# Patient Record
Sex: FEMALE | Race: BLACK OR AFRICAN AMERICAN | NOT HISPANIC OR LATINO
[De-identification: names, ages, dates, MRNs, and addresses within clinical notes are randomized per-mention and may not be internally consistent; named-entity substitution may affect disease eponyms.]

---

## 2017-03-26 ENCOUNTER — RESULT REVIEW (OUTPATIENT)
Age: 57
End: 2017-03-26

## 2019-10-22 ENCOUNTER — OUTPATIENT (OUTPATIENT)
Dept: OUTPATIENT SERVICES | Facility: HOSPITAL | Age: 59
LOS: 1 days | Discharge: HOME | End: 2019-10-22

## 2019-10-22 DIAGNOSIS — D72.89 OTHER SPECIFIED DISORDERS OF WHITE BLOOD CELLS: ICD-10-CM

## 2019-11-07 ENCOUNTER — RESULT REVIEW (OUTPATIENT)
Age: 59
End: 2019-11-07

## 2019-11-07 ENCOUNTER — APPOINTMENT (OUTPATIENT)
Dept: HEMATOLOGY ONCOLOGY | Facility: CLINIC | Age: 59
End: 2019-11-07
Payer: MEDICARE

## 2019-11-07 ENCOUNTER — OUTPATIENT (OUTPATIENT)
Dept: OUTPATIENT SERVICES | Facility: HOSPITAL | Age: 59
LOS: 1 days | End: 2019-11-07
Payer: MEDICARE

## 2019-11-07 VITALS
BODY MASS INDEX: 28.34 KG/M2 | OXYGEN SATURATION: 98 % | RESPIRATION RATE: 18 BRPM | TEMPERATURE: 98.3 F | HEIGHT: 66 IN | SYSTOLIC BLOOD PRESSURE: 138 MMHG | WEIGHT: 176.37 LBS | HEART RATE: 60 BPM | DIASTOLIC BLOOD PRESSURE: 84 MMHG

## 2019-11-07 DIAGNOSIS — Z87.891 PERSONAL HISTORY OF NICOTINE DEPENDENCE: ICD-10-CM

## 2019-11-07 DIAGNOSIS — D72.829 ELEVATED WHITE BLOOD CELL COUNT, UNSPECIFIED: ICD-10-CM

## 2019-11-07 DIAGNOSIS — Z78.9 OTHER SPECIFIED HEALTH STATUS: ICD-10-CM

## 2019-11-07 DIAGNOSIS — B37.9 CANDIDIASIS, UNSPECIFIED: ICD-10-CM

## 2019-11-07 LAB
EOSINOPHIL # BLD AUTO: 0.1 K/UL — SIGNIFICANT CHANGE UP (ref 0–0.5)
ERYTHROCYTE [SEDIMENTATION RATE] IN BLOOD: 22 MM/HR — HIGH (ref 0–20)
HCT VFR BLD CALC: 37.1 % — SIGNIFICANT CHANGE UP (ref 34.5–45)
HGB BLD-MCNC: 12.6 G/DL — SIGNIFICANT CHANGE UP (ref 11.5–15.5)
LG PLATELETS BLD QL AUTO: SLIGHT — SIGNIFICANT CHANGE UP
LYMPHOCYTES # BLD AUTO: 23 % — SIGNIFICANT CHANGE UP (ref 13–44)
LYMPHOCYTES # BLD AUTO: 3.2 K/UL — SIGNIFICANT CHANGE UP (ref 1–3.3)
MCHC RBC-ENTMCNC: 26.6 PG — LOW (ref 27–34)
MCHC RBC-ENTMCNC: 34 G/DL — SIGNIFICANT CHANGE UP (ref 32–36)
MCV RBC AUTO: 78.2 FL — LOW (ref 80–100)
MONOCYTES # BLD AUTO: 0.7 K/UL — SIGNIFICANT CHANGE UP (ref 0–0.9)
MONOCYTES NFR BLD AUTO: 5 % — SIGNIFICANT CHANGE UP (ref 2–14)
NEUTROPHILS # BLD AUTO: 8 K/UL — HIGH (ref 1.8–7.4)
NEUTROPHILS NFR BLD AUTO: 72 % — SIGNIFICANT CHANGE UP (ref 43–77)
PLAT MORPH BLD: NORMAL — SIGNIFICANT CHANGE UP
PLATELET # BLD AUTO: 414 K/UL — HIGH (ref 150–400)
RBC # BLD: 4.75 M/UL — SIGNIFICANT CHANGE UP (ref 3.8–5.2)
RBC # FLD: 13.3 % — SIGNIFICANT CHANGE UP (ref 10.3–14.5)
RBC BLD AUTO: SIGNIFICANT CHANGE UP
WBC # BLD: 12 K/UL — HIGH (ref 3.8–10.5)
WBC # FLD AUTO: 12 K/UL — HIGH (ref 3.8–10.5)

## 2019-11-07 PROCEDURE — 88185 FLOWCYTOMETRY/TC ADD-ON: CPT

## 2019-11-07 PROCEDURE — 87205 SMEAR GRAM STAIN: CPT

## 2019-11-07 PROCEDURE — 88184 FLOWCYTOMETRY/ TC 1 MARKER: CPT

## 2019-11-07 PROCEDURE — 99204 OFFICE O/P NEW MOD 45 MIN: CPT

## 2019-11-07 PROCEDURE — 88189 FLOWCYTOMETRY/READ 16 & >: CPT

## 2019-11-07 RX ORDER — FLUCONAZOLE 100 MG/1
100 TABLET ORAL DAILY
Qty: 7 | Refills: 0 | Status: ACTIVE | COMMUNITY
Start: 2019-11-07 | End: 1900-01-01

## 2019-11-08 LAB — TM INTERPRETATION: SIGNIFICANT CHANGE UP

## 2019-11-10 NOTE — REVIEW OF SYSTEMS
[Fatigue] : fatigue [Negative] : Allergic/Immunologic [FreeTextEntry2] : fatigue and weakness for years worse this year.   [FreeTextEntry8] : yeast infection that will not stop.

## 2019-11-10 NOTE — ASSESSMENT
[FreeTextEntry1] : This is a 59 year old woman who presents for the evaluation of a leukocytosis.  WBC in March 12.83, increased to 12.8 in April and 13.8 in August.  Differential remains normal.  Clinically suspect leukemoid reaction. Check LIZY, ESR, CRP for inflamation evaluation.  Check Flow cytometry rule out clonal process.  \par Given her clinical history, she does seem to have many issues with fungal infections, had a severe candidal rash over the summer on her flanks and under the breasts that is only just beginning to resolve.  Her vaginal candidiasis is still very much present on exam today. Recommend a trial of Fluconozole 100mg daily for a week, and return for re-evaluation in 2-3 weeks, if the WBC normalizes after treatment of the candidiasis, that is our source of inflammation and reactive leukocytosis.  \par Of note, later in the day, we were contacted by her pharmacy due to a drug interaction between fluconozole and trazodone.  Asked her to stop the trazodone for a week while she takes the fluconozole.\par \par Spent > 45 minutes in direct patient care and and addressed all questions and concerns.  >50% of this time was in direct face to face contact with patient during exam and counseling. \par The consultation room and exam room door was closed whenever appropriate for the duration of the consultation.

## 2019-11-10 NOTE — HISTORY OF PRESENT ILLNESS
[de-identified] : This is a 59 year old woman who presents for the evaluation of a leukocytosis with a normal differential.  She complains of constant fatigue.   Was found to have diabetes and a fatty liver. \par Had a thyroidectomy due to a growth. Hx of diabetes, on metformin and Tradjenta 5mg , hypothyroidism on levothyroxine and liothyronine 5mcg, hypertension on  lisinopril 40mg and metoprolol 100mg, wellbutrin 350+150mg daily she states, and trazodone 50mg, was previously on Risperidone 1mg+2mg now off, and Mirtazapine now off.\par Had a yeast infection that did not seem to resolve, did start a cream from her GYN but did not help much, took a probiotic that did help.  She states that it had never really resolved and is now worse than it ever was.  \par \par PCP Dr. Abraham Wang.   [de-identified] : This is a 59 year old woman who presents for the evaluation of a leukocytosis,  WBC 13.7 9/25/19 with  59%% N 31% lymphocytes, 8.8% monocytes.   \par Was elevated for most of this year, 4/10/19 12.86 and 3/13/19 12.83\par \par Sister Sheryl and her mother had a kidney transplant.

## 2019-11-11 PROBLEM — D72.829 LEUKOCYTOSIS: Status: ACTIVE | Noted: 2019-11-07

## 2019-11-14 ENCOUNTER — MESSAGE (OUTPATIENT)
Age: 59
End: 2019-11-14

## 2019-11-14 LAB
ALBUMIN SERPL ELPH-MCNC: 4.7 G/DL
ALP BLD-CCNC: 113 U/L
ALT SERPL-CCNC: 49 U/L
ANA PAT FLD IF-IMP: NORMAL
ANA SER IF-ACNC: ABNORMAL
ANION GAP SERPL CALC-SCNC: 14 MMOL/L
AST SERPL-CCNC: 28 U/L
BILIRUB SERPL-MCNC: <0.2 MG/DL
BUN SERPL-MCNC: 13 MG/DL
CALCIUM SERPL-MCNC: 10.4 MG/DL
CHLORIDE SERPL-SCNC: 103 MMOL/L
CO2 SERPL-SCNC: 24 MMOL/L
CREAT SERPL-MCNC: 0.97 MG/DL
CRP SERPL-MCNC: 0.36 MG/DL
GLUCOSE SERPL-MCNC: 93 MG/DL
POTASSIUM SERPL-SCNC: 4.1 MMOL/L
PROT SERPL-MCNC: 7.3 G/DL
RHEUMATOID FACT SER QL: <10 IU/ML
SODIUM SERPL-SCNC: 141 MMOL/L

## 2019-11-14 RX ORDER — OMEPRAZOLE 20 MG/1
20 CAPSULE, DELAYED RELEASE ORAL DAILY
Qty: 30 | Refills: 0 | Status: ACTIVE | COMMUNITY
Start: 2019-11-14 | End: 1900-01-01

## 2019-11-30 ENCOUNTER — OUTPATIENT (OUTPATIENT)
Dept: OUTPATIENT SERVICES | Facility: HOSPITAL | Age: 59
LOS: 1 days | Discharge: HOME | End: 2019-11-30

## 2019-11-30 DIAGNOSIS — D72.829 ELEVATED WHITE BLOOD CELL COUNT, UNSPECIFIED: ICD-10-CM

## 2020-01-09 ENCOUNTER — OUTPATIENT (OUTPATIENT)
Dept: OUTPATIENT SERVICES | Facility: HOSPITAL | Age: 60
LOS: 1 days | End: 2020-01-09
Payer: COMMERCIAL

## 2020-01-09 ENCOUNTER — APPOINTMENT (OUTPATIENT)
Dept: HEMATOLOGY ONCOLOGY | Facility: CLINIC | Age: 60
End: 2020-01-09
Payer: MEDICARE

## 2020-01-09 ENCOUNTER — RESULT REVIEW (OUTPATIENT)
Age: 60
End: 2020-01-09

## 2020-01-09 ENCOUNTER — OUTPATIENT (OUTPATIENT)
Dept: OUTPATIENT SERVICES | Facility: HOSPITAL | Age: 60
LOS: 1 days | Discharge: HOME | End: 2020-01-09

## 2020-01-09 VITALS
OXYGEN SATURATION: 99 % | BODY MASS INDEX: 28.18 KG/M2 | RESPIRATION RATE: 16 BRPM | SYSTOLIC BLOOD PRESSURE: 146 MMHG | HEART RATE: 69 BPM | WEIGHT: 174.6 LBS | TEMPERATURE: 98.6 F | DIASTOLIC BLOOD PRESSURE: 82 MMHG

## 2020-01-09 DIAGNOSIS — R10.9 UNSPECIFIED ABDOMINAL PAIN: ICD-10-CM

## 2020-01-09 DIAGNOSIS — D72.89 OTHER SPECIFIED DISORDERS OF WHITE BLOOD CELLS: ICD-10-CM

## 2020-01-09 DIAGNOSIS — D72.829 ELEVATED WHITE BLOOD CELL COUNT, UNSPECIFIED: ICD-10-CM

## 2020-01-09 LAB
BASOPHILS # BLD AUTO: 0.1 K/UL — SIGNIFICANT CHANGE UP (ref 0–0.2)
BASOPHILS NFR BLD AUTO: 0.9 % — SIGNIFICANT CHANGE UP (ref 0–2)
EOSINOPHIL # BLD AUTO: 0.2 K/UL — SIGNIFICANT CHANGE UP (ref 0–0.5)
EOSINOPHIL NFR BLD AUTO: 1.8 % — SIGNIFICANT CHANGE UP (ref 0–6)
HCT VFR BLD CALC: 38.3 % — SIGNIFICANT CHANGE UP (ref 34.5–45)
HGB BLD-MCNC: 12.9 G/DL — SIGNIFICANT CHANGE UP (ref 11.5–15.5)
LYMPHOCYTES # BLD AUTO: 3.9 K/UL — HIGH (ref 1–3.3)
LYMPHOCYTES # BLD AUTO: 31.4 % — SIGNIFICANT CHANGE UP (ref 13–44)
MCHC RBC-ENTMCNC: 25.4 PG — LOW (ref 27–34)
MCHC RBC-ENTMCNC: 33.7 G/DL — SIGNIFICANT CHANGE UP (ref 32–36)
MCV RBC AUTO: 75.3 FL — LOW (ref 80–100)
MONOCYTES # BLD AUTO: 0.8 K/UL — SIGNIFICANT CHANGE UP (ref 0–0.9)
MONOCYTES NFR BLD AUTO: 6.2 % — SIGNIFICANT CHANGE UP (ref 2–14)
NEUTROPHILS # BLD AUTO: 7.5 K/UL — HIGH (ref 1.8–7.4)
NEUTROPHILS NFR BLD AUTO: 59.8 % — SIGNIFICANT CHANGE UP (ref 43–77)
PLATELET # BLD AUTO: 384 K/UL — SIGNIFICANT CHANGE UP (ref 150–400)
RBC # BLD: 5.08 M/UL — SIGNIFICANT CHANGE UP (ref 3.8–5.2)
RBC # FLD: 13.7 % — SIGNIFICANT CHANGE UP (ref 10.3–14.5)
WBC # BLD: 12.6 K/UL — HIGH (ref 3.8–10.5)
WBC # FLD AUTO: 12.6 K/UL — HIGH (ref 3.8–10.5)

## 2020-01-09 PROCEDURE — 81207 BCR/ABL1 GENE MINOR BP: CPT

## 2020-01-09 PROCEDURE — G0452: CPT | Mod: 26

## 2020-01-09 PROCEDURE — 81206 BCR/ABL1 GENE MAJOR BP: CPT

## 2020-01-09 PROCEDURE — 99213 OFFICE O/P EST LOW 20 MIN: CPT

## 2020-01-11 NOTE — HISTORY OF PRESENT ILLNESS
[de-identified] : This is a 59 year old woman who presents for the evaluation of a leukocytosis with a normal differential.  She complains of constant fatigue.   Was found to have diabetes and a fatty liver. \par Had a thyroidectomy due to a growth. Hx of diabetes, on metformin and Tradjenta 5mg , hypothyroidism on levothyroxine and liothyronine 5mcg, hypertension on  lisinopril 40mg and metoprolol 100mg, wellbutrin 350+150mg daily she states, and trazodone 50mg, was previously on Risperidone 1mg+2mg now off, and Mirtazapine now off.\par Had a yeast infection that did not seem to resolve, did start a cream from her GYN but did not help much, took a probiotic that did help.  She states that it had never really resolved and is now worse than it ever was.  \par \par PCP Dr. Abraham Wang.   [de-identified] : Patient states yeast infection still quite bad.  \par I had given her a week of Diflucan, it did get a little bit better but \par Metformin has been upsetting her stomach quite a bit.  \par Leukocytosis persists today WBC remains 12.6g/dl  \par Dr. Amarjit Eagle endocrine also had labs which we lynn for her.

## 2020-01-11 NOTE — ASSESSMENT
[FreeTextEntry1] : This is a 59 year old woman who presents for the evaluation of a leukocytosis.  WBC in March 12.83, increased to 12.8 in April and 13.8 in August.  Differential remains normal. Still appears to be a reactive leukocytosis however still unclear what this is a reaction to.  Vaginal discharge did not respond to topicals nor Diflucan either.   Check BCR/ABL rule out myeloproliferative disorder.

## 2020-01-12 ENCOUNTER — FORM ENCOUNTER (OUTPATIENT)
Age: 60
End: 2020-01-12

## 2020-01-13 ENCOUNTER — APPOINTMENT (OUTPATIENT)
Dept: CT IMAGING | Facility: IMAGING CENTER | Age: 60
End: 2020-01-13
Payer: MEDICARE

## 2020-01-13 ENCOUNTER — OUTPATIENT (OUTPATIENT)
Dept: OUTPATIENT SERVICES | Facility: HOSPITAL | Age: 60
LOS: 1 days | End: 2020-01-13
Payer: COMMERCIAL

## 2020-01-13 DIAGNOSIS — R10.9 UNSPECIFIED ABDOMINAL PAIN: ICD-10-CM

## 2020-01-13 DIAGNOSIS — D72.829 ELEVATED WHITE BLOOD CELL COUNT, UNSPECIFIED: ICD-10-CM

## 2020-01-13 LAB — BCR/ABL BY RT - PCR QUANTITATIVE: SIGNIFICANT CHANGE UP

## 2020-01-13 PROCEDURE — 74177 CT ABD & PELVIS W/CONTRAST: CPT | Mod: 26

## 2020-01-13 PROCEDURE — 74177 CT ABD & PELVIS W/CONTRAST: CPT

## 2020-01-17 LAB
ALBUMIN MFR SERPL ELPH: 57.4 %
ALBUMIN SERPL ELPH-MCNC: 4.7 G/DL
ALBUMIN SERPL-MCNC: 4.2 G/DL
ALBUMIN/GLOB SERPL: 1.3 RATIO
ALP BLD-CCNC: 108 U/L
ALPHA1 GLOB MFR SERPL ELPH: 4.8 %
ALPHA1 GLOB SERPL ELPH-MCNC: 0.4 G/DL
ALPHA2 GLOB MFR SERPL ELPH: 10.7 %
ALPHA2 GLOB SERPL ELPH-MCNC: 0.8 G/DL
ALT SERPL-CCNC: 32 U/L
ANDROSTANOLONE SERPL-MCNC: 2.6 NG/DL
ANION GAP SERPL CALC-SCNC: 14 MMOL/L
AST SERPL-CCNC: 20 U/L
B-GLOBULIN MFR SERPL ELPH: 12.3 %
B-GLOBULIN SERPL ELPH-MCNC: 0.9 G/DL
BILIRUB SERPL-MCNC: 0.2 MG/DL
BUN SERPL-MCNC: 11 MG/DL
CALCIUM SERPL-MCNC: 10.4 MG/DL
CHLORIDE SERPL-SCNC: 101 MMOL/L
CHOLEST SERPL-MCNC: 206 MG/DL
CHOLEST/HDLC SERPL: 4.5 RATIO
CO2 SERPL-SCNC: 26 MMOL/L
CREAT SERPL-MCNC: 0.9 MG/DL
DEPRECATED KAPPA LC FREE/LAMBDA SER: 1.47 RATIO
DEPRECATED KAPPA LC FREE/LAMBDA SER: 1.47 RATIO
ESTIMATED AVERAGE GLUCOSE: 134 MG/DL
ESTRADIOL SERPL-MCNC: 10 PG/ML
ESTRADIOL ULTRASENSITIVE: 12 PG/ML
FERRITIN SERPL-MCNC: 39 NG/ML
FOLATE SERPL-MCNC: 19.8 NG/ML
FSH SERPL-MCNC: 50.1 IU/L
GAMMA GLOB FLD ELPH-MCNC: 1.1 G/DL
GAMMA GLOB MFR SERPL ELPH: 14.8 %
GLUCOSE SERPL-MCNC: 100 MG/DL
HBA1C MFR BLD HPLC: 6.3 %
HDLC SERPL-MCNC: 46 MG/DL
IGA SER QL IEP: 187 MG/DL
IGG SER QL IEP: 1192 MG/DL
IGM SER QL IEP: 56 MG/DL
INTERPRETATION SERPL IEP-IMP: NORMAL
IRON SATN MFR SERPL: 12 %
IRON SERPL-MCNC: 41 UG/DL
KAPPA LC CSF-MCNC: 1.07 MG/DL
KAPPA LC CSF-MCNC: 1.07 MG/DL
KAPPA LC SERPL-MCNC: 1.57 MG/DL
KAPPA LC SERPL-MCNC: 1.57 MG/DL
LDLC SERPL CALC-MCNC: 122 MG/DL
LH SERPL-ACNC: 31.8 IU/L
M PROTEIN SPEC IFE-MCNC: NORMAL
POTASSIUM SERPL-SCNC: 4.5 MMOL/L
PROT SERPL-MCNC: 7.4 G/DL
SODIUM SERPL-SCNC: 140 MMOL/L
T3FREE SERPL-MCNC: 2.56 PG/ML
T4 SERPL-MCNC: 9.7 UG/DL
TESTOST BND SERPL-MCNC: 1.8 PG/ML
TESTOST SERPL-MCNC: 12.4 NG/DL
THYROGLOB AB SERPL-ACNC: <20 IU/ML
THYROGLOB SERPL-MCNC: 3.33 NG/ML
THYROPEROXIDASE AB SERPL IA-ACNC: 20.1 IU/ML
TIBC SERPL-MCNC: 348 UG/DL
TRIGL SERPL-MCNC: 190 MG/DL
TSH SERPL-ACNC: 2.57 UIU/ML
TSI ACT/NOR SER: <0.1 IU/L
UIBC SERPL-MCNC: 307 UG/DL
VIT A SERPL-MCNC: 56.8 UG/DL
VIT B1 SERPL-MCNC: 117.6 NMOL/L
VIT B12 SERPL-MCNC: 1075 PG/ML

## 2020-03-14 ENCOUNTER — OUTPATIENT (OUTPATIENT)
Dept: OUTPATIENT SERVICES | Facility: HOSPITAL | Age: 60
LOS: 1 days | Discharge: HOME | End: 2020-03-14

## 2020-03-14 DIAGNOSIS — D72.89 OTHER SPECIFIED DISORDERS OF WHITE BLOOD CELLS: ICD-10-CM

## 2020-03-19 ENCOUNTER — APPOINTMENT (OUTPATIENT)
Dept: HEMATOLOGY ONCOLOGY | Facility: CLINIC | Age: 60
End: 2020-03-19

## 2020-04-13 ENCOUNTER — APPOINTMENT (OUTPATIENT)
Dept: HEMATOLOGY ONCOLOGY | Facility: CLINIC | Age: 60
End: 2020-04-13

## 2020-07-09 DIAGNOSIS — E27.8 OTHER SPECIFIED DISORDERS OF ADRENAL GLAND: ICD-10-CM

## 2020-07-13 ENCOUNTER — APPOINTMENT (OUTPATIENT)
Dept: INTERNAL MEDICINE | Facility: CLINIC | Age: 60
End: 2020-07-13
Payer: MEDICARE

## 2020-07-13 VITALS
DIASTOLIC BLOOD PRESSURE: 81 MMHG | RESPIRATION RATE: 16 BRPM | TEMPERATURE: 98.1 F | WEIGHT: 169.42 LBS | SYSTOLIC BLOOD PRESSURE: 136 MMHG | HEART RATE: 66 BPM | BODY MASS INDEX: 27.23 KG/M2 | OXYGEN SATURATION: 100 % | HEIGHT: 66 IN

## 2020-07-13 DIAGNOSIS — R53.83 OTHER FATIGUE: ICD-10-CM

## 2020-07-13 PROCEDURE — 99203 OFFICE O/P NEW LOW 30 MIN: CPT | Mod: 25

## 2020-07-13 PROCEDURE — 36415 COLL VENOUS BLD VENIPUNCTURE: CPT

## 2020-07-13 RX ORDER — METOPROLOL SUCCINATE 50 MG/1
50 TABLET, EXTENDED RELEASE ORAL
Qty: 90 | Refills: 3 | Status: ACTIVE | COMMUNITY
Start: 2020-07-13 | End: 1900-01-01

## 2020-07-13 RX ORDER — METOPROLOL TARTRATE 100 MG/1
100 TABLET, FILM COATED ORAL
Qty: 180 | Refills: 0 | Status: DISCONTINUED | COMMUNITY
Start: 2020-01-22

## 2020-07-13 RX ORDER — INDAPAMIDE 1.25 MG/1
1.25 TABLET, FILM COATED ORAL
Qty: 90 | Refills: 0 | Status: DISCONTINUED | COMMUNITY
Start: 2020-01-22

## 2020-07-15 ENCOUNTER — OUTPATIENT (OUTPATIENT)
Dept: OUTPATIENT SERVICES | Facility: HOSPITAL | Age: 60
LOS: 1 days | Discharge: HOME | End: 2020-07-15

## 2020-07-15 ENCOUNTER — OUTPATIENT (OUTPATIENT)
Dept: OUTPATIENT SERVICES | Facility: HOSPITAL | Age: 60
LOS: 1 days | End: 2020-07-15
Payer: COMMERCIAL

## 2020-07-15 ENCOUNTER — APPOINTMENT (OUTPATIENT)
Dept: MRI IMAGING | Facility: IMAGING CENTER | Age: 60
End: 2020-07-15
Payer: MEDICARE

## 2020-07-15 DIAGNOSIS — Z00.8 ENCOUNTER FOR OTHER GENERAL EXAMINATION: ICD-10-CM

## 2020-07-15 DIAGNOSIS — D72.89 OTHER SPECIFIED DISORDERS OF WHITE BLOOD CELLS: ICD-10-CM

## 2020-07-15 DIAGNOSIS — E27.8 OTHER SPECIFIED DISORDERS OF ADRENAL GLAND: ICD-10-CM

## 2020-07-15 PROCEDURE — 74183 MRI ABD W/O CNTR FLWD CNTR: CPT | Mod: 26

## 2020-07-15 PROCEDURE — A9585: CPT

## 2020-07-15 PROCEDURE — 74183 MRI ABD W/O CNTR FLWD CNTR: CPT

## 2020-07-16 DIAGNOSIS — Z12.31 ENCOUNTER FOR SCREENING MAMMOGRAM FOR MALIGNANT NEOPLASM OF BREAST: ICD-10-CM

## 2020-07-16 LAB
25(OH)D3 SERPL-MCNC: 50.7 NG/ML
ALBUMIN SERPL ELPH-MCNC: 4.9 G/DL
ALP BLD-CCNC: 119 U/L
ALT SERPL-CCNC: 27 U/L
ANA PAT FLD IF-IMP: NORMAL
ANA SER IF-ACNC: ABNORMAL
ANION GAP SERPL CALC-SCNC: 15 MMOL/L
AST SERPL-CCNC: 23 U/L
BASOPHILS # BLD AUTO: 0.06 K/UL
BASOPHILS NFR BLD AUTO: 0.5 %
BILIRUB SERPL-MCNC: <0.2 MG/DL
BUN SERPL-MCNC: 15 MG/DL
CALCIUM SERPL-MCNC: 10.6 MG/DL
CHLORIDE SERPL-SCNC: 106 MMOL/L
CK SERPL-CCNC: 117 U/L
CO2 SERPL-SCNC: 21 MMOL/L
CREAT SERPL-MCNC: 0.88 MG/DL
EOSINOPHIL # BLD AUTO: 0.2 K/UL
EOSINOPHIL NFR BLD AUTO: 1.6 %
ESTIMATED AVERAGE GLUCOSE: 128 MG/DL
GLUCOSE SERPL-MCNC: 92 MG/DL
HBA1C MFR BLD HPLC: 6.1 %
HCT VFR BLD CALC: 40.8 %
HGB BLD-MCNC: 12.3 G/DL
IMM GRANULOCYTES NFR BLD AUTO: 0.2 %
LYMPHOCYTES # BLD AUTO: 4.1 K/UL
LYMPHOCYTES NFR BLD AUTO: 32.6 %
MAN DIFF?: NORMAL
MCHC RBC-ENTMCNC: 24.4 PG
MCHC RBC-ENTMCNC: 30.1 GM/DL
MCV RBC AUTO: 81 FL
MONOCYTES # BLD AUTO: 0.98 K/UL
MONOCYTES NFR BLD AUTO: 7.8 %
NEUTROPHILS # BLD AUTO: 7.19 K/UL
NEUTROPHILS NFR BLD AUTO: 57.3 %
PLATELET # BLD AUTO: 480 K/UL
POTASSIUM SERPL-SCNC: 4.6 MMOL/L
PROT SERPL-MCNC: 7.8 G/DL
RBC # BLD: 5.04 M/UL
RBC # FLD: 15.9 %
SODIUM SERPL-SCNC: 142 MMOL/L
T3FREE SERPL-MCNC: 2.76 PG/ML
T4 FREE SERPL-MCNC: 1.6 NG/DL
TSH SERPL-ACNC: 0.39 UIU/ML
VIT B12 SERPL-MCNC: >2000 PG/ML
WBC # FLD AUTO: 12.56 K/UL

## 2020-07-17 ENCOUNTER — RESULT REVIEW (OUTPATIENT)
Age: 60
End: 2020-07-17

## 2020-07-17 ENCOUNTER — OUTPATIENT (OUTPATIENT)
Dept: OUTPATIENT SERVICES | Facility: HOSPITAL | Age: 60
LOS: 1 days | End: 2020-07-17
Payer: COMMERCIAL

## 2020-07-17 ENCOUNTER — APPOINTMENT (OUTPATIENT)
Dept: HEMATOLOGY ONCOLOGY | Facility: CLINIC | Age: 60
End: 2020-07-17
Payer: MEDICARE

## 2020-07-17 VITALS
WEIGHT: 167.99 LBS | BODY MASS INDEX: 27.11 KG/M2 | DIASTOLIC BLOOD PRESSURE: 74 MMHG | OXYGEN SATURATION: 99 % | SYSTOLIC BLOOD PRESSURE: 117 MMHG | TEMPERATURE: 98.7 F | HEART RATE: 65 BPM | RESPIRATION RATE: 15 BRPM

## 2020-07-17 DIAGNOSIS — D72.829 ELEVATED WHITE BLOOD CELL COUNT, UNSPECIFIED: ICD-10-CM

## 2020-07-17 LAB
ACRM BINDING ANTIBODY: 0 NMOL/L
BASOPHILS # BLD AUTO: 0.04 K/UL — SIGNIFICANT CHANGE UP (ref 0–0.2)
BASOPHILS NFR BLD AUTO: 0.4 % — SIGNIFICANT CHANGE UP (ref 0–2)
EOSINOPHIL # BLD AUTO: 0 K/UL — SIGNIFICANT CHANGE UP (ref 0–0.5)
EOSINOPHIL NFR BLD AUTO: 0 % — SIGNIFICANT CHANGE UP (ref 0–6)
HCT VFR BLD CALC: 37.9 % — SIGNIFICANT CHANGE UP (ref 34.5–45)
HGB BLD-MCNC: 12.1 G/DL — SIGNIFICANT CHANGE UP (ref 11.5–15.5)
IMM GRANULOCYTES NFR BLD AUTO: 0.4 % — SIGNIFICANT CHANGE UP (ref 0–1.5)
LYMPHOCYTES # BLD AUTO: 2.78 K/UL — SIGNIFICANT CHANGE UP (ref 1–3.3)
LYMPHOCYTES # BLD AUTO: 27.7 % — SIGNIFICANT CHANGE UP (ref 13–44)
MCHC RBC-ENTMCNC: 24.8 PG — LOW (ref 27–34)
MCHC RBC-ENTMCNC: 31.9 GM/DL — LOW (ref 32–36)
MCV RBC AUTO: 77.8 FL — LOW (ref 80–100)
MONOCYTES # BLD AUTO: 0.86 K/UL — SIGNIFICANT CHANGE UP (ref 0–0.9)
MONOCYTES NFR BLD AUTO: 8.6 % — SIGNIFICANT CHANGE UP (ref 2–14)
NEUTROPHILS # BLD AUTO: 6.31 K/UL — SIGNIFICANT CHANGE UP (ref 1.8–7.4)
NEUTROPHILS NFR BLD AUTO: 62.9 % — SIGNIFICANT CHANGE UP (ref 43–77)
NRBC # BLD: 0 /100 WBCS — SIGNIFICANT CHANGE UP (ref 0–0)
PLATELET # BLD AUTO: 446 K/UL — HIGH (ref 150–400)
RBC # BLD: 4.87 M/UL — SIGNIFICANT CHANGE UP (ref 3.8–5.2)
RBC # FLD: 15.5 % — HIGH (ref 10.3–14.5)
RETICS #: 58.9 K/UL — SIGNIFICANT CHANGE UP (ref 25–125)
RETICS/RBC NFR: 1.2 % — SIGNIFICANT CHANGE UP (ref 0.5–2.5)
WBC # BLD: 10.03 K/UL — SIGNIFICANT CHANGE UP (ref 3.8–10.5)
WBC # FLD AUTO: 10.03 K/UL — SIGNIFICANT CHANGE UP (ref 3.8–10.5)

## 2020-07-17 PROCEDURE — 99214 OFFICE O/P EST MOD 30 MIN: CPT

## 2020-07-17 PROCEDURE — 88185 FLOWCYTOMETRY/TC ADD-ON: CPT

## 2020-07-17 PROCEDURE — 88189 FLOWCYTOMETRY/READ 16 & >: CPT

## 2020-07-17 PROCEDURE — 88184 FLOWCYTOMETRY/ TC 1 MARKER: CPT

## 2020-07-17 PROCEDURE — 87205 SMEAR GRAM STAIN: CPT

## 2020-07-17 RX ORDER — NYSTATIN 100000 [USP'U]/G
100000 CREAM TOPICAL TWICE DAILY
Qty: 1 | Refills: 0 | Status: ACTIVE | COMMUNITY
Start: 2020-07-17 | End: 1900-01-01

## 2020-07-18 NOTE — ASSESSMENT
[FreeTextEntry1] : This is a 59 year old woman who initially presents to our office for the evaluation of a leukocytosis.  Appeared to be reactive leukemoid reaction, no evidence of hematologic malignancy.  Will recheck flow cytometry to completely exclude this.  Patients chief complaint remains severe fatigue.  Informed her that this can not be hematologic since we have already ruled out leukemia, and patient has a mild iron deficiency without anemia.  Suspect its from her insomnia, patient sleeping at 6 AM lately. She has been seeing a new psychiatrist. Encouraged her to continue with the lamotrigine and the wellbutrin.   Asked her to stop the melatonin, Benadryl, and tylenol and try a 50mg dose of Hydroxyzine at night roughly at 9 PM to aid with sleep. Asked her to follow up with her psychiatrist for further recommendations on this.  \par \par MRI of the abdomen w/ and w/o contrast demonstrates a left renal cyst and a left adrenal adenoma. Explained to patient that this is a benign condition that should remain asymptomatic, repeat MRI in a year.

## 2020-07-18 NOTE — HISTORY OF PRESENT ILLNESS
[de-identified] : Patient has severe insomnia. Tries to go to sleep at midnight, used to sleep at 3 AM but now its more like 6 AM wakes up  at 11:30 feeling very tired.  \par \par Tried trazodone and melatonin without success.  TOok regular tylenol not tylenol PM and it knocked her out.  \par \par Trips and rolers over ankles once very 23 days or so, no foot drop.  \par \par Paitent taking lamotrigine and wellbutrin.  with psychiatrist.  Has a new psychiatrist.  DOes not recall name.  WAs taking 350 and 150 of wellbutrin.  \par \par Takes wellbutrin in the AM, takes lamotrigine later on in the afternoon at around 5 PM.  \emily \emily Sees psychiatrist next month.   [de-identified] : Patient presents for routine follow up.  She complains of severe fatigue and weakness.  She has a has severe insomnia. Tries to go to sleep at midnight, used to sleep at 3 AM but now its more like 6 AM wakes up  at 11:30 feeling very tired.  \par \par Tried trazodone and melatonin without success.  TOok regular tylenol not tylenol PM and it knocked her out.  \par \par Trips and rolers over ankles once very 23 days or so, no foot drop.  \par \par Patient taking lamotrigine and wellbutrin.  with psychiatrist.  Has a new psychiatrist.  DOes not recall name.  WAs taking 350 and 150 of wellbutrin.  \par \par Takes Wellbutrin in the AM, takes lamotrigine later on in the afternoon at around 5 PM.  \par \par Sees psychiatrist next month.  \par \par patient completed her MRI of the abdomen for the evaluation of her left adrenal nodule as detailed below.  \par \par COMPARISON: CT 1/13/2020. \par \par PROCEDURE: \par MRI of the abdomen was performed with and without intravenous contrast. \par IV Contrast: Gadavist. 10 cc administered, 0 cc discarded. \par \par FINDINGS: \par LOWER CHEST: Within normal limits. \par \par LIVER: Hepatic steatosis. Subcentimeter right hepatic lobe cyst. \par BILE DUCTS: Normal caliber. \par GALLBLADDER: Cholelithiasis. \par SPLEEN: Within normal limits. \par PANCREAS: Within normal limits. \par ADRENALS: A 1.2 cm left adrenal nodule with signal loss on out of phase \par imaging is consistent with an adenoma. \par KIDNEYS/URETERS: An 8 mm hemorrhagic cyst in the left renal lower pole \par corresponds to the indeterminate CT finding. A 1.0 cm left renal interpolar \par cystic lesion with slightly thickened septations on series 16, image 18 is \par stable in size (Bosniak 2F). Additional subcentimeter renal cysts. \par \par VISUALIZED PORTIONS: \par BOWEL: Duodenal diverticulum. \par PERITONEUM: No ascites. \par VESSELS: Within normal limits. \par RETROPERITONEUM/LYMPH NODES: No lymphadenopathy. \par ABDOMINAL WALL: Diastasis recti. \par BONES: Within normal limits. \par \par IMPRESSION: \par 1. Left adrenal adenoma. \par 2. Subcentimeter hemorrhagic left renal cyst corresponds to the \par indeterminate CT finding. \par 3. Left renal Bosniak 2F cystic lesion. Follow up contrast-enhanced MRI in \par one year is recommended. \par 4. Hepatic steatosis.

## 2020-07-20 LAB — TM INTERPRETATION: SIGNIFICANT CHANGE UP

## 2020-07-20 NOTE — PHYSICAL EXAM
[Normal Appearance] : normal in appearance [No Axillary Lymphadenopathy] : no axillary lymphadenopathy [No Masses] : no palpable masses [No Nipple Discharge] : no nipple discharge [Normal] : affect was normal and insight and judgment were intact

## 2020-07-20 NOTE — ASSESSMENT
[FreeTextEntry1] : Fatigue: suspect secondary to depression, chronic fatigue syndrome? Has followed with rheumatology in the past. Check TFT's, muscle enzymes and myasthenia workup. Possibly secondary to metoprolol? decrease dose and f/u in 2 weeks. Also advised to f/u for mammogram.

## 2020-07-20 NOTE — HISTORY OF PRESENT ILLNESS
[de-identified] : Has been feeling fatigued for 2-3 months he has no energy. Denies any changes to medication denies any fevers chills difficulty he denies alcohol or drug. Feels slightly weaker throughout the day. Denies any history of any autoimmune conditions. Following with hematology for leukocytosis. Has not had mammogram for a few years. Had colonoscopy. Denies any abdominal pain after eating. [FreeTextEntry1] : Patient presents today for feeling fatigued

## 2020-07-21 LAB
ALBUMIN SERPL ELPH-MCNC: 4.8 G/DL
ALP BLD-CCNC: 116 U/L
ALT SERPL-CCNC: 29 U/L
ANION GAP SERPL CALC-SCNC: 15 MMOL/L
AST SERPL-CCNC: 24 U/L
BILIRUB SERPL-MCNC: <0.2 MG/DL
BUN SERPL-MCNC: 17 MG/DL
CALCIUM SERPL-MCNC: 10.3 MG/DL
CHLORIDE SERPL-SCNC: 107 MMOL/L
CO2 SERPL-SCNC: 22 MMOL/L
CREAT SERPL-MCNC: 0.83 MG/DL
GLUCOSE SERPL-MCNC: 108 MG/DL
POTASSIUM SERPL-SCNC: 4.5 MMOL/L
PROT SERPL-MCNC: 7.5 G/DL
SODIUM SERPL-SCNC: 143 MMOL/L

## 2020-07-22 LAB — MUSK ANTIBODY TEST: NORMAL

## 2020-09-01 ENCOUNTER — RESULT REVIEW (OUTPATIENT)
Age: 60
End: 2020-09-01

## 2020-09-01 ENCOUNTER — OUTPATIENT (OUTPATIENT)
Dept: OUTPATIENT SERVICES | Facility: HOSPITAL | Age: 60
LOS: 1 days | End: 2020-09-01
Payer: COMMERCIAL

## 2020-09-01 ENCOUNTER — APPOINTMENT (OUTPATIENT)
Dept: MAMMOGRAPHY | Facility: IMAGING CENTER | Age: 60
End: 2020-09-01
Payer: MEDICARE

## 2020-09-01 DIAGNOSIS — Z00.8 ENCOUNTER FOR OTHER GENERAL EXAMINATION: ICD-10-CM

## 2020-09-01 PROCEDURE — 77063 BREAST TOMOSYNTHESIS BI: CPT

## 2020-09-01 PROCEDURE — 77067 SCR MAMMO BI INCL CAD: CPT

## 2020-09-01 PROCEDURE — 77067 SCR MAMMO BI INCL CAD: CPT | Mod: 26

## 2020-09-01 PROCEDURE — 77063 BREAST TOMOSYNTHESIS BI: CPT | Mod: 26

## 2021-01-12 ENCOUNTER — OUTPATIENT (OUTPATIENT)
Dept: OUTPATIENT SERVICES | Facility: HOSPITAL | Age: 61
LOS: 1 days | Discharge: HOME | End: 2021-01-12

## 2021-01-12 DIAGNOSIS — D72.89 OTHER SPECIFIED DISORDERS OF WHITE BLOOD CELLS: ICD-10-CM

## 2021-02-03 ENCOUNTER — RX RENEWAL (OUTPATIENT)
Age: 61
End: 2021-02-03

## 2021-02-04 ENCOUNTER — APPOINTMENT (OUTPATIENT)
Dept: HEMATOLOGY ONCOLOGY | Facility: CLINIC | Age: 61
End: 2021-02-04

## 2021-02-11 RX ORDER — HYDROXYZINE HYDROCHLORIDE 50 MG/1
50 TABLET ORAL
Qty: 30 | Refills: 2 | Status: ACTIVE | COMMUNITY
Start: 2020-07-17 | End: 1900-01-01

## 2021-08-19 ENCOUNTER — OUTPATIENT (OUTPATIENT)
Dept: OUTPATIENT SERVICES | Facility: HOSPITAL | Age: 61
LOS: 1 days | Discharge: HOME | End: 2021-08-19

## 2021-08-19 DIAGNOSIS — D72.89 OTHER SPECIFIED DISORDERS OF WHITE BLOOD CELLS: ICD-10-CM

## 2021-08-20 ENCOUNTER — RESULT REVIEW (OUTPATIENT)
Age: 61
End: 2021-08-20

## 2021-08-20 ENCOUNTER — APPOINTMENT (OUTPATIENT)
Dept: HEMATOLOGY ONCOLOGY | Facility: CLINIC | Age: 61
End: 2021-08-20
Payer: MEDICARE

## 2021-08-20 VITALS
BODY MASS INDEX: 26.75 KG/M2 | WEIGHT: 166.45 LBS | SYSTOLIC BLOOD PRESSURE: 159 MMHG | RESPIRATION RATE: 15 BRPM | HEIGHT: 66 IN | TEMPERATURE: 97 F | DIASTOLIC BLOOD PRESSURE: 87 MMHG | HEART RATE: 65 BPM | OXYGEN SATURATION: 98 %

## 2021-08-20 LAB
BASOPHILS # BLD AUTO: 0.05 K/UL — SIGNIFICANT CHANGE UP (ref 0–0.2)
BASOPHILS NFR BLD AUTO: 0.4 % — SIGNIFICANT CHANGE UP (ref 0–2)
EOSINOPHIL # BLD AUTO: 0.14 K/UL — SIGNIFICANT CHANGE UP (ref 0–0.5)
EOSINOPHIL NFR BLD AUTO: 1.2 % — SIGNIFICANT CHANGE UP (ref 0–6)
HCT VFR BLD CALC: 40.6 % — SIGNIFICANT CHANGE UP (ref 34.5–45)
HGB BLD-MCNC: 12.8 G/DL — SIGNIFICANT CHANGE UP (ref 11.5–15.5)
IMM GRANULOCYTES NFR BLD AUTO: 0.3 % — SIGNIFICANT CHANGE UP (ref 0–1.5)
LYMPHOCYTES # BLD AUTO: 26.8 % — SIGNIFICANT CHANGE UP (ref 13–44)
LYMPHOCYTES # BLD AUTO: 3.07 K/UL — SIGNIFICANT CHANGE UP (ref 1–3.3)
MCHC RBC-ENTMCNC: 25.1 PG — LOW (ref 27–34)
MCHC RBC-ENTMCNC: 31.5 G/DL — LOW (ref 32–36)
MCV RBC AUTO: 79.8 FL — LOW (ref 80–100)
MONOCYTES # BLD AUTO: 1.04 K/UL — HIGH (ref 0–0.9)
MONOCYTES NFR BLD AUTO: 9.1 % — SIGNIFICANT CHANGE UP (ref 2–14)
NEUTROPHILS # BLD AUTO: 7.14 K/UL — SIGNIFICANT CHANGE UP (ref 1.8–7.4)
NEUTROPHILS NFR BLD AUTO: 62.2 % — SIGNIFICANT CHANGE UP (ref 43–77)
NRBC # BLD: 0 /100 WBCS — SIGNIFICANT CHANGE UP (ref 0–0)
PLATELET # BLD AUTO: 412 K/UL — HIGH (ref 150–400)
RBC # BLD: 5.09 M/UL — SIGNIFICANT CHANGE UP (ref 3.8–5.2)
RBC # FLD: 14.6 % — HIGH (ref 10.3–14.5)
WBC # BLD: 11.47 K/UL — HIGH (ref 3.8–10.5)
WBC # FLD AUTO: 11.47 K/UL — HIGH (ref 3.8–10.5)

## 2021-08-20 PROCEDURE — 99214 OFFICE O/P EST MOD 30 MIN: CPT

## 2021-08-24 LAB
ALBUMIN SERPL ELPH-MCNC: 4.8 G/DL
ALP BLD-CCNC: 122 U/L
ALT SERPL-CCNC: 23 U/L
ANION GAP SERPL CALC-SCNC: 11 MMOL/L
AST SERPL-CCNC: 16 U/L
BILIRUB SERPL-MCNC: 0.2 MG/DL
BUN SERPL-MCNC: 23 MG/DL
CALCIUM SERPL-MCNC: 10.3 MG/DL
CHLORIDE SERPL-SCNC: 105 MMOL/L
CO2 SERPL-SCNC: 28 MMOL/L
CREAT SERPL-MCNC: 0.84 MG/DL
GLUCOSE SERPL-MCNC: 86 MG/DL
POTASSIUM SERPL-SCNC: 3.9 MMOL/L
PROT SERPL-MCNC: 7.4 G/DL
SODIUM SERPL-SCNC: 144 MMOL/L
T3FREE SERPL-MCNC: 2.98 PG/ML
T4 SERPL-MCNC: 10 UG/DL
TSH SERPL-ACNC: 0.63 UIU/ML

## 2021-08-24 NOTE — ASSESSMENT
[FreeTextEntry1] : This is a 61 year old woman who initially presents to our office for the evaluation of a leukocytosis.  Appeared to be reactive leukemoid reaction, no evidence of hematologic malignancy.  Workup was entirely normal.  Clearly a secondary leukocytosis, however unable to truly identify a cause for the reaction.  Patient returns today for follow up on this. was also on thyroid medication but unable to get an appointment with her endocrinologist.  will recheck CBC and run the TFT's including TSH T3 T4 to try to help her out wit the thyroid issues\par \par \par Addendum 8/24/21\par TSH, normal.  Can continue the recently increased dose of levothyroxine 100mcg.   WBC 11 again, no immature cells.  Juan Daniel unable to answer, left message with patient's sister wit ha request for a call back.

## 2021-08-24 NOTE — HISTORY OF PRESENT ILLNESS
[de-identified] : Patient has severe insomnia. Tries to go to sleep at midnight, used to sleep at 3 AM but now its more like 6 AM wakes up  at 11:30 feeling very tired.  \par \par Tried trazodone and melatonin without success.  TOok regular tylenol not tylenol PM and it knocked her out.  \par \par Trips and rolers over ankles once very 23 days or so, no foot drop.  \par \par Paitent taking lamotrigine and wellbutrin.  with psychiatrist.  Has a new psychiatrist.  DOes not recall name.  WAs taking 350 and 150 of wellbutrin.  \par \par Takes wellbutrin in the AM, takes lamotrigine later on in the afternoon at around 5 PM.  \emily \emily Sees psychiatrist next month.   [de-identified] : Patient concerned about high WBC, on last visit, July 172020 WBC was normal at 10.03.  \par She did succeed in getting the COVID 19 vaccine.  Patient \par No new medications, patient started some medications for a bulging in her eye.  \par \par Had some additional bloodwork at [facility] with a high WBC but these values are unavailable to me right now.

## 2021-08-26 ENCOUNTER — NON-APPOINTMENT (OUTPATIENT)
Age: 61
End: 2021-08-26

## 2021-09-02 ENCOUNTER — OUTPATIENT (OUTPATIENT)
Dept: OUTPATIENT SERVICES | Facility: HOSPITAL | Age: 61
LOS: 1 days | End: 2021-09-02
Payer: MEDICARE

## 2021-09-02 ENCOUNTER — APPOINTMENT (OUTPATIENT)
Dept: MAMMOGRAPHY | Facility: IMAGING CENTER | Age: 61
End: 2021-09-02
Payer: MEDICARE

## 2021-09-02 DIAGNOSIS — Z00.8 ENCOUNTER FOR OTHER GENERAL EXAMINATION: ICD-10-CM

## 2021-09-02 PROCEDURE — 77063 BREAST TOMOSYNTHESIS BI: CPT

## 2021-09-02 PROCEDURE — 77067 SCR MAMMO BI INCL CAD: CPT

## 2021-09-02 PROCEDURE — 77067 SCR MAMMO BI INCL CAD: CPT | Mod: 26

## 2021-09-02 PROCEDURE — 77063 BREAST TOMOSYNTHESIS BI: CPT | Mod: 26

## 2022-01-20 ENCOUNTER — OUTPATIENT (OUTPATIENT)
Dept: OUTPATIENT SERVICES | Facility: HOSPITAL | Age: 62
LOS: 1 days | Discharge: HOME | End: 2022-01-20

## 2022-01-20 DIAGNOSIS — D72.89 OTHER SPECIFIED DISORDERS OF WHITE BLOOD CELLS: ICD-10-CM

## 2022-01-21 ENCOUNTER — APPOINTMENT (OUTPATIENT)
Dept: HEMATOLOGY ONCOLOGY | Facility: CLINIC | Age: 62
End: 2022-01-21
Payer: MEDICARE

## 2022-01-21 PROCEDURE — 99214 OFFICE O/P EST MOD 30 MIN: CPT | Mod: 95

## 2022-01-22 NOTE — ASSESSMENT
[FreeTextEntry1] : This is a 61 year old woman who initially presents to our office for the evaluation of a leukocytosis. Patient has a Being leukocytosis, WBC not particularly high.  Will continue to follow in case this develops into an MPN.  Will have bloodwork done over the weekend given this appointment was changed from an in person visit to telehealth.

## 2022-01-22 NOTE — HISTORY OF PRESENT ILLNESS
[de-identified] : This is a 59 year old woman who presents for the evaluation of a leukocytosis with a normal differential. She complains of constant fatigue. Was found to have diabetes and a fatty liver. \emily Had a thyroidectomy due to a growth. Hx of diabetes, on metformin and Tradjenta 5mg , hypothyroidism on levothyroxine and liothyronine 5mcg, hypertension on lisinopril 40mg and metoprolol 100mg, wellbutrin 350+150mg daily she states, and trazodone 50mg, was previously on Risperidone 1mg+2mg now off, and Mirtazapine now off.\emily Had a yeast infection that did not seem to resolve, did start a cream from her GYN but did not help much, took a probiotic that did help. She states that it had never really resolved and is now worse than it ever was. \par \par Patient has severe insomnia. Tries to go to sleep at midnight, used to sleep at 3 AM but now its more like 6 AM wakes up  at 11:30 feeling very tired.  \par \emily Tried trazodone and melatonin without success.  TOok regular tylenol not tylenol PM and it knocked her out.  \par \par Trips and rolers over ankles once very 23 days or so, no foot drop.  \par \emily Paitent taking lamotrigine and wellbutrin.  with psychiatrist.  Has a new psychiatrist.  DOes not recall name.  WAs taking 350 and 150 of wellbutrin.  \par \emily Takes wellbutrin in the AM, takes lamotrigine later on in the afternoon at around 5 PM.  \emily ball Sees psychiatrist next month.   [de-identified] : Patient returns for follow up of the mild leukocytosis.  No more recent bloodwork since the last visit in August \par  when count was 11.47.  \par Asked her to have the labwork repeated.  \par

## 2022-01-23 ENCOUNTER — EMERGENCY (EMERGENCY)
Facility: HOSPITAL | Age: 62
LOS: 0 days | Discharge: HOME | End: 2022-01-23
Attending: EMERGENCY MEDICINE
Payer: MEDICARE

## 2022-01-23 VITALS
WEIGHT: 160.06 LBS | OXYGEN SATURATION: 98 % | DIASTOLIC BLOOD PRESSURE: 90 MMHG | HEART RATE: 62 BPM | RESPIRATION RATE: 16 BRPM | TEMPERATURE: 99 F | HEIGHT: 66 IN | SYSTOLIC BLOOD PRESSURE: 165 MMHG

## 2022-01-23 DIAGNOSIS — Z79.84 LONG TERM (CURRENT) USE OF ORAL HYPOGLYCEMIC DRUGS: ICD-10-CM

## 2022-01-23 DIAGNOSIS — N89.8 OTHER SPECIFIED NONINFLAMMATORY DISORDERS OF VAGINA: ICD-10-CM

## 2022-01-23 DIAGNOSIS — I10 ESSENTIAL (PRIMARY) HYPERTENSION: ICD-10-CM

## 2022-01-23 DIAGNOSIS — B37.3 CANDIDIASIS OF VULVA AND VAGINA: ICD-10-CM

## 2022-01-23 DIAGNOSIS — E78.00 PURE HYPERCHOLESTEROLEMIA, UNSPECIFIED: ICD-10-CM

## 2022-01-23 DIAGNOSIS — E11.9 TYPE 2 DIABETES MELLITUS WITHOUT COMPLICATIONS: ICD-10-CM

## 2022-01-23 PROCEDURE — 99283 EMERGENCY DEPT VISIT LOW MDM: CPT

## 2022-01-23 RX ORDER — LISINOPRIL 2.5 MG/1
0 TABLET ORAL
Qty: 0 | Refills: 0 | DISCHARGE

## 2022-01-23 RX ORDER — BUPROPION HYDROCHLORIDE 150 MG/1
1 TABLET, EXTENDED RELEASE ORAL
Qty: 0 | Refills: 0 | DISCHARGE

## 2022-01-23 RX ORDER — METFORMIN HYDROCHLORIDE 850 MG/1
1 TABLET ORAL
Qty: 0 | Refills: 0 | DISCHARGE

## 2022-01-23 RX ORDER — FLUCONAZOLE 150 MG/1
150 TABLET ORAL ONCE
Refills: 0 | Status: COMPLETED | OUTPATIENT
Start: 2022-01-23 | End: 2022-01-23

## 2022-01-23 RX ADMIN — FLUCONAZOLE 150 MILLIGRAM(S): 150 TABLET ORAL at 21:55

## 2022-01-23 NOTE — ED ADULT NURSE NOTE - OBJECTIVE STATEMENT
received er bed 140 c/o vaginal irritation with burning itching and d/c noted since yesterday used monostat vaginal suppository and cream yesterday with brief improvement but symptoms returned and worse today

## 2022-01-23 NOTE — ED PROVIDER NOTE - NSFOLLOWUPINSTRUCTIONS_ED_ALL_ED_FT
Vaginal Yeast Infection, Adult       Vaginal yeast infection is a condition that causes vaginal discharge as well as soreness, swelling, and redness (inflammation) of the vagina. This is a common condition. Some women get this infection frequently.      What are the causes?    This condition is caused by a change in the normal balance of the yeast (candida) and bacteria that live in the vagina. This change causes an overgrowth of yeast, which causes the inflammation.      What increases the risk?    The condition is more likely to develop in women who:  •Take antibiotic medicines.      •Have diabetes.      •Take birth control pills.      •Are pregnant.      •Douche often.      •Have a weak body defense system (immune system).      •Have been taking steroid medicines for a long time.      •Frequently wear tight clothing.        What are the signs or symptoms?    Symptoms of this condition include:  •White, thick, creamy vaginal discharge.      •Swelling, itching, redness, and irritation of the vagina. The lips of the vagina (vulva) may be affected as well.      •Pain or a burning feeling while urinating.      •Pain during sex.        How is this diagnosed?    This condition is diagnosed based on:  •Your medical history.      •A physical exam.      •A pelvic exam. Your health care provider will examine a sample of your vaginal discharge under a microscope. Your health care provider may send this sample for testing to confirm the diagnosis.        How is this treated?    This condition is treated with medicine. Medicines may be over-the-counter or prescription. You may be told to use one or more of the following:  •Medicine that is taken by mouth (orally).      •Medicine that is applied as a cream (topically).      •Medicine that is inserted directly into the vagina (suppository).        Follow these instructions at home:     Lifestyle     • Do not have sex until your health care provider approves. Tell your sex partner that you have a yeast infection. That person should go to his or her health care provider and ask if they should also be treated.      • Do not wear tight clothes, such as pantyhose or tight pants.      •Wear breathable cotton underwear.      General instructions     •Take or apply over-the-counter and prescription medicines only as told by your health care provider.      •Eat more yogurt. This may help to keep your yeast infection from returning.      • Do not use tampons until your health care provider approves.      •Try taking a sitz bath to help with discomfort. This is a warm water bath that is taken while you are sitting down. The water should only come up to your hips and should cover your buttocks. Do this 3–4 times per day or as told by your health care provider.      • Do not douche.      •If you have diabetes, keep your blood sugar levels under control.      •Keep all follow-up visits as told by your health care provider. This is important.        Contact a health care provider if:    •You have a fever.      •Your symptoms go away and then return.      •Your symptoms do not get better with treatment.      •Your symptoms get worse.      •You have new symptoms.      •You develop blisters in or around your vagina.      •You have blood coming from your vagina and it is not your menstrual period.      •You develop pain in your abdomen.        Summary    •Vaginal yeast infection is a condition that causes discharge as well as soreness, swelling, and redness (inflammation) of the vagina.      •This condition is treated with medicine. Medicines may be over-the-counter or prescription.      •Take or apply over-the-counter and prescription medicines only as told by your health care provider.      • Do not douche. Do not have sex or use tampons until your health care provider approves.      •Contact a health care provider if your symptoms do not get better with treatment or your symptoms go away and then return.      This information is not intended to replace advice given to you by your health care provider. Make sure you discuss any questions you have with your health care provider.

## 2022-01-23 NOTE — ED PROVIDER NOTE - OBJECTIVE STATEMENT
61 year old female with PMH of DM II, HTN presenting to ED due to vaginal discharge with irritation and burning and discomfort otherwise having no bleeding or pain to abdomen. States has had prior yeast infection in past with treatment with creams succeeding in treatment.

## 2022-01-23 NOTE — ED PROVIDER NOTE - CLINICAL SUMMARY MEDICAL DECISION MAKING FREE TEXT BOX
pt with yeast infection noted given diflucan in ED - will dc with additional dose to be taken 3 days from now if yeast infection not yet improved.

## 2022-01-23 NOTE — ED PROVIDER NOTE - PATIENT PORTAL LINK FT
You can access the FollowMyHealth Patient Portal offered by [facility] by registering at the following website: [email or web address]. By joining "[employer]’s FollowMyHealth portal, you will also be able to view your health information using other applications (apps) compatible with our system.

## 2022-01-26 RX ORDER — FLUCONAZOLE 150 MG/1
1 TABLET ORAL
Qty: 1 | Refills: 0
Start: 2022-01-26 | End: 2022-01-26

## 2022-01-28 ENCOUNTER — EMERGENCY (EMERGENCY)
Facility: HOSPITAL | Age: 62
LOS: 0 days | Discharge: HOME | End: 2022-01-28
Attending: EMERGENCY MEDICINE
Payer: MEDICARE

## 2022-01-28 VITALS
OXYGEN SATURATION: 97 % | HEIGHT: 66 IN | HEART RATE: 71 BPM | DIASTOLIC BLOOD PRESSURE: 95 MMHG | SYSTOLIC BLOOD PRESSURE: 180 MMHG | RESPIRATION RATE: 16 BRPM | TEMPERATURE: 98 F | WEIGHT: 162.04 LBS

## 2022-01-28 VITALS
SYSTOLIC BLOOD PRESSURE: 165 MMHG | DIASTOLIC BLOOD PRESSURE: 80 MMHG | RESPIRATION RATE: 17 BRPM | OXYGEN SATURATION: 98 % | HEART RATE: 68 BPM

## 2022-01-28 DIAGNOSIS — E11.9 TYPE 2 DIABETES MELLITUS WITHOUT COMPLICATIONS: ICD-10-CM

## 2022-01-28 DIAGNOSIS — Z79.84 LONG TERM (CURRENT) USE OF ORAL HYPOGLYCEMIC DRUGS: ICD-10-CM

## 2022-01-28 DIAGNOSIS — B37.3 CANDIDIASIS OF VULVA AND VAGINA: ICD-10-CM

## 2022-01-28 PROCEDURE — 99283 EMERGENCY DEPT VISIT LOW MDM: CPT

## 2022-01-28 RX ORDER — LIDOCAINE 4 G/100G
1 CREAM TOPICAL
Qty: 1 | Refills: 0
Start: 2022-01-28 | End: 2022-01-30

## 2022-01-28 RX ORDER — IBREXAFUNGERP 150 MG/1
2 TABLET, FILM COATED ORAL
Qty: 4 | Refills: 0
Start: 2022-01-28 | End: 2022-01-28

## 2022-01-28 RX ORDER — LIDOCAINE 4 G/100G
1 CREAM TOPICAL ONCE
Refills: 0 | Status: COMPLETED | OUTPATIENT
Start: 2022-01-28 | End: 2022-01-28

## 2022-01-28 RX ADMIN — Medication 1 APPLICATORFUL: at 17:41

## 2022-01-28 RX ADMIN — LIDOCAINE 1 APPLICATION(S): 4 CREAM TOPICAL at 18:11

## 2022-01-28 NOTE — ED PROVIDER NOTE - OBJECTIVE STATEMENT
61 year old female with PMH of DM II recently seen and treated for vaginal fungal infection and given fluconazole by me 5 days ago. States there was initial improvement but itching and redness came back 2 days ago and did not improve this time with fluconazole. Pt denies any fever/chills, no dysuria.

## 2022-01-28 NOTE — ED PROVIDER NOTE - NSFOLLOWUPINSTRUCTIONS_ED_ALL_ED_FT
Yeast Infection    WHAT YOU NEED TO KNOW:    A yeast infection, or vaginal candidiasis, is a common vaginal infection. A yeast infection is caused by a fungus, or yeast-like germ. Fungi are normally found in your vagina. Too many fungi can cause an infection.    DISCHARGE INSTRUCTIONS:    Call your doctor or gynecologist if:   •You have a fever and chills.      •You develop abdominal or pelvic pain.      •Your discharge is bloody and it is not your monthly period.      •Your signs and symptoms get worse, even after treatment.      •You have questions or concerns about your condition or care.      Medicines:   •Medicines help treat the fungal infection and decrease inflammation. The medicine may be a pill, cream, ointment, or vaginal tablet or suppository.      •Take your medicine as directed. Contact your healthcare provider if you think your medicine is not helping or if you have side effects. Tell him of her if you are allergic to any medicine. Keep a list of the medicines, vitamins, and herbs you take. Include the amounts, and when and why you take them. Bring the list or the pill bottles to follow-up visits. Carry your medicine list with you in case of an emergency.      Keep your vagina healthy:   •Clean your genital area with mild soap and warm water each day. Do not get soap inside your vagina. Gently dry the area after washing. Do not use hot tubs. The heat and moisture from hot tubs can increase your risk for another yeast infection.      •Always wipe from front to back after you use the toilet. This prevents spreading bacteria from your rectal area into your vagina.      •Do not wear tight-fitting clothes or undergarments for long periods of time. Wear cotton underwear during the day. Cotton helps keep your genital area dry and does not hold in warmth or moisture. Do not wear underwear at night.      •Do not douche or use feminine hygiene sprays or bubble bath. Do not use pads or tampons that are scented, or colored or perfumed toilet paper.      •Do not have sex until your symptoms go away. Have your partner wear a condom until you complete your course of medication.      •Ask your healthcare provider about birth control options if necessary. Condoms have latex and diaphragms have gel that kills sperm. Both of these may irritate your genital area.      Follow up with your doctor or gynecologist as directed: Write down your questions so you remember to ask them during your visits.

## 2022-01-28 NOTE — ED ADULT TRIAGE NOTE - CHIEF COMPLAINT QUOTE
pt a&O x4 walk in from home c.o of vaginal burning itching. seen here x5 days ago diagnosed with yeast infection. symptoms worsening.

## 2022-01-28 NOTE — ED ADULT NURSE NOTE - OBJECTIVE STATEMENT
Pt seen here and diagnosed with yeast infection, took 2 doses of Fluconazole but infection came back. Pt sts discomfort is a lot more intense, white discharge noted on assessment.

## 2022-01-28 NOTE — ED PROVIDER NOTE - PATIENT PORTAL LINK FT
You can access the FollowMyHealth Patient Portal offered by [facility] by registering at the following website: [email or web address]. By joining [employer]’s FollowMyHealth portal, you will also be able to view your health information using other applications (apps) compatible with our system.

## 2022-01-28 NOTE — ED PROVIDER NOTE - CLINICAL SUMMARY MEDICAL DECISION MAKING FREE TEXT BOX
pt with recurrence of vaginal yeast infection - will treat with clotrimazole cream and with Ibrexafungerp

## 2022-01-28 NOTE — ED PROVIDER NOTE - GENITOURINARY BLADDER
vaginal lips with mild swelling, thick white cottage cheese like discharge noted, no focal tenderness or redness but grossly appears inflamed and erythematous on inner aspect of vaginal

## 2022-01-28 NOTE — ED ADULT NURSE NOTE - NSIMPLEMENTINTERV_GEN_ALL_ED
Implemented All Universal Safety Interventions:  [place] to call system. Call bell, personal items and telephone within reach. Instruct patient to call for assistance. Room bathroom lighting operational. Non-slip footwear when patient is off stretcher. Physically safe environment: no spills, clutter or unnecessary equipment. Stretcher in lowest position, wheels locked, appropriate side rails in place.

## 2022-01-29 PROBLEM — E78.00 PURE HYPERCHOLESTEROLEMIA, UNSPECIFIED: Chronic | Status: ACTIVE | Noted: 2022-01-23

## 2022-01-29 PROBLEM — I10 ESSENTIAL (PRIMARY) HYPERTENSION: Chronic | Status: ACTIVE | Noted: 2022-01-23

## 2022-01-29 PROBLEM — E11.9 TYPE 2 DIABETES MELLITUS WITHOUT COMPLICATIONS: Chronic | Status: ACTIVE | Noted: 2022-01-23

## 2022-07-25 ENCOUNTER — OUTPATIENT (OUTPATIENT)
Dept: OUTPATIENT SERVICES | Facility: HOSPITAL | Age: 62
LOS: 1 days | Discharge: HOME | End: 2022-07-25

## 2022-07-25 DIAGNOSIS — D72.89 OTHER SPECIFIED DISORDERS OF WHITE BLOOD CELLS: ICD-10-CM

## 2022-08-03 ENCOUNTER — RESULT REVIEW (OUTPATIENT)
Age: 62
End: 2022-08-03

## 2022-08-03 ENCOUNTER — APPOINTMENT (OUTPATIENT)
Dept: HEMATOLOGY ONCOLOGY | Facility: CLINIC | Age: 62
End: 2022-08-03

## 2022-08-03 VITALS
DIASTOLIC BLOOD PRESSURE: 91 MMHG | RESPIRATION RATE: 16 BRPM | HEART RATE: 55 BPM | TEMPERATURE: 97.5 F | BODY MASS INDEX: 24.8 KG/M2 | WEIGHT: 154.32 LBS | SYSTOLIC BLOOD PRESSURE: 167 MMHG | OXYGEN SATURATION: 100 % | HEIGHT: 66 IN

## 2022-08-03 LAB
BASOPHILS # BLD AUTO: 0.05 K/UL — SIGNIFICANT CHANGE UP (ref 0–0.2)
BASOPHILS NFR BLD AUTO: 0.6 % — SIGNIFICANT CHANGE UP (ref 0–2)
EOSINOPHIL # BLD AUTO: 0.19 K/UL — SIGNIFICANT CHANGE UP (ref 0–0.5)
EOSINOPHIL NFR BLD AUTO: 2.2 % — SIGNIFICANT CHANGE UP (ref 0–6)
HCT VFR BLD CALC: 36.8 % — SIGNIFICANT CHANGE UP (ref 34.5–45)
HGB BLD-MCNC: 12 G/DL — SIGNIFICANT CHANGE UP (ref 11.5–15.5)
IMM GRANULOCYTES NFR BLD AUTO: 0.2 % — SIGNIFICANT CHANGE UP (ref 0–1.5)
LYMPHOCYTES # BLD AUTO: 3.27 K/UL — SIGNIFICANT CHANGE UP (ref 1–3.3)
LYMPHOCYTES # BLD AUTO: 38.5 % — SIGNIFICANT CHANGE UP (ref 13–44)
MCHC RBC-ENTMCNC: 25.2 PG — LOW (ref 27–34)
MCHC RBC-ENTMCNC: 32.6 G/DL — SIGNIFICANT CHANGE UP (ref 32–36)
MCV RBC AUTO: 77.3 FL — LOW (ref 80–100)
MONOCYTES # BLD AUTO: 0.76 K/UL — SIGNIFICANT CHANGE UP (ref 0–0.9)
MONOCYTES NFR BLD AUTO: 8.9 % — SIGNIFICANT CHANGE UP (ref 2–14)
NEUTROPHILS # BLD AUTO: 4.21 K/UL — SIGNIFICANT CHANGE UP (ref 1.8–7.4)
NEUTROPHILS NFR BLD AUTO: 49.6 % — SIGNIFICANT CHANGE UP (ref 43–77)
NRBC # BLD: 0 /100 WBCS — SIGNIFICANT CHANGE UP (ref 0–0)
PLATELET # BLD AUTO: 390 K/UL — SIGNIFICANT CHANGE UP (ref 150–400)
RBC # BLD: 4.76 M/UL — SIGNIFICANT CHANGE UP (ref 3.8–5.2)
RBC # FLD: 14.7 % — HIGH (ref 10.3–14.5)
WBC # BLD: 8.5 K/UL — SIGNIFICANT CHANGE UP (ref 3.8–10.5)
WBC # FLD AUTO: 8.5 K/UL — SIGNIFICANT CHANGE UP (ref 3.8–10.5)

## 2022-08-03 PROCEDURE — 99214 OFFICE O/P EST MOD 30 MIN: CPT

## 2022-08-06 NOTE — HISTORY OF PRESENT ILLNESS
[de-identified] : This is a 59 year old woman who presents for the evaluation of a leukocytosis with a normal differential. She complains of constant fatigue. Was found to have diabetes and a fatty liver. \emily Had a thyroidectomy due to a growth. Hx of diabetes, on metformin and Tradjenta 5mg , hypothyroidism on levothyroxine and liothyronine 5mcg, hypertension on lisinopril 40mg and metoprolol 100mg, wellbutrin 350+150mg daily she states, and trazodone 50mg, was previously on Risperidone 1mg+2mg now off, and Mirtazapine now off.\emily Had a yeast infection that did not seem to resolve, did start a cream from her GYN but did not help much, took a probiotic that did help. She states that it had never really resolved and is now worse than it ever was. \par \emily Patient has severe insomnia. Tries to go to sleep at midnight, used to sleep at 3 AM but now its more like 6 AM wakes up  at 11:30 feeling very tired.  \par \emily Tried trazodone and melatonin without success.  TOok regular tylenol not tylenol PM and it knocked her out.  \par \par Patient taking lamotrigine and wellbutrin.  with psychiatrist.  Has a new psychiatrist.  DOes not recall name.  WAs taking 350 and 150 of wellbutrin.  \emily ball Takes wellbutrin in the AM, takes lamotrigine later on in the afternoon at around 5 PM.  \emily ball Sees psychiatrist next month.   [de-identified] : Patient returns for the evaluation and follow up of a chronic leukocytosis.  Leukocytosis on and off for 3 years. To day it was 8.5k/ul, normal also completely normal diff.  Patient feeling well and has no complaints.

## 2022-08-06 NOTE — ASSESSMENT
[FreeTextEntry1] : This is a 62 year old woman who initially presents to our office for the evaluation of a leukocytosis and thrombocytosis.   Patient has a Being leukocytosis, WBC not particularly high.  Over time it seems to have fallen back down to normal again.  WBC 8.5k/ul  today, normal diff, Plate 390k/ul.  Given normalization of the counts, would not need to follow closely for development into MPN anymore.  Will continue following annually for recurrence.

## 2022-09-21 ENCOUNTER — OUTPATIENT (OUTPATIENT)
Dept: OUTPATIENT SERVICES | Facility: HOSPITAL | Age: 62
LOS: 1 days | End: 2022-09-21
Payer: MEDICARE

## 2022-09-21 ENCOUNTER — APPOINTMENT (OUTPATIENT)
Dept: MAMMOGRAPHY | Facility: IMAGING CENTER | Age: 62
End: 2022-09-21

## 2022-09-21 DIAGNOSIS — Z00.8 ENCOUNTER FOR OTHER GENERAL EXAMINATION: ICD-10-CM

## 2022-09-21 PROCEDURE — 77063 BREAST TOMOSYNTHESIS BI: CPT

## 2022-09-21 PROCEDURE — 77067 SCR MAMMO BI INCL CAD: CPT

## 2022-09-21 PROCEDURE — 77063 BREAST TOMOSYNTHESIS BI: CPT | Mod: 26

## 2022-09-21 PROCEDURE — 77067 SCR MAMMO BI INCL CAD: CPT | Mod: 26

## 2023-07-20 ENCOUNTER — OUTPATIENT (OUTPATIENT)
Dept: OUTPATIENT SERVICES | Facility: HOSPITAL | Age: 63
LOS: 1 days | Discharge: HOME | End: 2023-07-20

## 2023-07-20 DIAGNOSIS — D72.89 OTHER SPECIFIED DISORDERS OF WHITE BLOOD CELLS: ICD-10-CM

## 2023-08-01 ENCOUNTER — RESULT REVIEW (OUTPATIENT)
Age: 63
End: 2023-08-01

## 2023-08-01 ENCOUNTER — APPOINTMENT (OUTPATIENT)
Dept: HEMATOLOGY ONCOLOGY | Facility: CLINIC | Age: 63
End: 2023-08-01
Payer: MEDICARE

## 2023-08-01 ENCOUNTER — APPOINTMENT (OUTPATIENT)
Dept: HEMATOLOGY ONCOLOGY | Facility: CLINIC | Age: 63
End: 2023-08-01

## 2023-08-01 VITALS
BODY MASS INDEX: 27.17 KG/M2 | SYSTOLIC BLOOD PRESSURE: 153 MMHG | OXYGEN SATURATION: 99 % | DIASTOLIC BLOOD PRESSURE: 86 MMHG | WEIGHT: 169.07 LBS | HEART RATE: 55 BPM | RESPIRATION RATE: 16 BRPM | TEMPERATURE: 97.1 F | HEIGHT: 66 IN

## 2023-08-01 LAB
BASOPHILS # BLD AUTO: 0.03 K/UL — SIGNIFICANT CHANGE UP (ref 0–0.2)
BASOPHILS NFR BLD AUTO: 0.3 % — SIGNIFICANT CHANGE UP (ref 0–2)
EOSINOPHIL # BLD AUTO: 0 K/UL — SIGNIFICANT CHANGE UP (ref 0–0.5)
EOSINOPHIL NFR BLD AUTO: 0 % — SIGNIFICANT CHANGE UP (ref 0–6)
HCT VFR BLD CALC: 39.1 % — SIGNIFICANT CHANGE UP (ref 34.5–45)
HGB BLD-MCNC: 12.5 G/DL — SIGNIFICANT CHANGE UP (ref 11.5–15.5)
IMM GRANULOCYTES NFR BLD AUTO: 0.1 % — SIGNIFICANT CHANGE UP (ref 0–0.9)
LYMPHOCYTES # BLD AUTO: 28.7 % — SIGNIFICANT CHANGE UP (ref 13–44)
LYMPHOCYTES # BLD AUTO: 3.07 K/UL — SIGNIFICANT CHANGE UP (ref 1–3.3)
MCHC RBC-ENTMCNC: 25.3 PG — LOW (ref 27–34)
MCHC RBC-ENTMCNC: 32 G/DL — SIGNIFICANT CHANGE UP (ref 32–36)
MCV RBC AUTO: 79 FL — LOW (ref 80–100)
MONOCYTES # BLD AUTO: 1.09 K/UL — HIGH (ref 0–0.9)
MONOCYTES NFR BLD AUTO: 10.2 % — SIGNIFICANT CHANGE UP (ref 2–14)
NEUTROPHILS # BLD AUTO: 6.51 K/UL — SIGNIFICANT CHANGE UP (ref 1.8–7.4)
NEUTROPHILS NFR BLD AUTO: 60.7 % — SIGNIFICANT CHANGE UP (ref 43–77)
NRBC # BLD: 0 /100 WBCS — SIGNIFICANT CHANGE UP (ref 0–0)
PLATELET # BLD AUTO: 328 K/UL — SIGNIFICANT CHANGE UP (ref 150–400)
RBC # BLD: 4.95 M/UL — SIGNIFICANT CHANGE UP (ref 3.8–5.2)
RBC # FLD: 15.9 % — HIGH (ref 10.3–14.5)
RETICS #: 59.9 K/UL — SIGNIFICANT CHANGE UP (ref 25–125)
RETICS/RBC NFR: 1.2 % — SIGNIFICANT CHANGE UP (ref 0.5–2.5)
WBC # BLD: 10.71 K/UL — HIGH (ref 3.8–10.5)
WBC # FLD AUTO: 10.71 K/UL — HIGH (ref 3.8–10.5)

## 2023-08-01 PROCEDURE — 99214 OFFICE O/P EST MOD 30 MIN: CPT

## 2023-08-01 NOTE — ASSESSMENT
[FreeTextEntry1] : This is a 63 year old woman who initially presents to our office for the evaluation of a leukocytosis and thrombocytosis.  On last visit in 2022, WBC normal at 8.5k/ul, today barely abnormal. PCP had been telling her its been quite high, but this 10.7k/ul is essentially her baseline 0nly 0.2k/ul off from normal. normal differential.  No hematologic issues detected, Patient can return to PCP for routine annual physical, if she has another discrepancy in her laboratories, can make a follow up appointment for re-evaluation.    Explained to patient that there is no natural supplement for her to take to really modify this WBC count back to normal.  THIs is essentially her baseline and is in no way diet related.

## 2023-08-01 NOTE — HISTORY OF PRESENT ILLNESS
[de-identified] : This is a 59 year old woman who presents for the evaluation of a leukocytosis with a normal differential. She complains of constant fatigue. Was found to have diabetes and a fatty liver. \emily  Had a thyroidectomy due to a growth. Hx of diabetes, on metformin and Tradjenta 5mg , hypothyroidism on levothyroxine and liothyronine 5mcg, hypertension on lisinopril 40mg and metoprolol 100mg, wellbutrin 350+150mg daily she states, and trazodone 50mg, was previously on Risperidone 1mg+2mg now off, and Mirtazapine now off.\emily  Had a yeast infection that did not seem to resolve, did start a cream from her GYN but did not help much, took a probiotic that did help. She states that it had never really resolved and is now worse than it ever was. \par  \emily  Patient has severe insomnia. Tries to go to sleep at midnight, used to sleep at 3 AM but now its more like 6 AM wakes up  at 11:30 feeling very tired.  \par  \emily  Tried trazodone and melatonin without success.  TOok regular tylenol not tylenol PM and it knocked her out.  \par  \par  Patient taking lamotrigine and wellbutrin.  with psychiatrist.  Has a new psychiatrist.  DOes not recall name.  WAs taking 350 and 150 of wellbutrin.  \emily ball  Takes wellbutrin in the AM, takes lamotrigine later on in the afternoon at around 5 PM.  \emily ball  Sees psychiatrist next month.   [de-identified] : Patient with history of intermittent leukocytosis . WBC 10.7k/ul barely abnormal. normal dfif reactive leukocytosis.

## 2023-08-03 LAB
FERRITIN SERPL-MCNC: 32 NG/ML
FOLATE SERPL-MCNC: >20 NG/ML
IRON SATN MFR SERPL: 20 %
IRON SERPL-MCNC: 75 UG/DL
TIBC SERPL-MCNC: 382 UG/DL
UIBC SERPL-MCNC: 307 UG/DL
VIT B12 SERPL-MCNC: >2000 PG/ML

## 2025-01-24 ENCOUNTER — APPOINTMENT (OUTPATIENT)
Dept: OTOLARYNGOLOGY | Facility: CLINIC | Age: 65
End: 2025-01-24
Payer: MEDICARE

## 2025-01-24 VITALS
RESPIRATION RATE: 17 BRPM | HEART RATE: 64 BPM | SYSTOLIC BLOOD PRESSURE: 131 MMHG | OXYGEN SATURATION: 97 % | HEIGHT: 66 IN | BODY MASS INDEX: 27.64 KG/M2 | DIASTOLIC BLOOD PRESSURE: 85 MMHG | WEIGHT: 172 LBS

## 2025-01-24 DIAGNOSIS — Z86.79 PERSONAL HISTORY OF OTHER DISEASES OF THE CIRCULATORY SYSTEM: ICD-10-CM

## 2025-01-24 DIAGNOSIS — Z86.39 PERSONAL HISTORY OF OTHER ENDOCRINE, NUTRITIONAL AND METABOLIC DISEASE: ICD-10-CM

## 2025-01-24 DIAGNOSIS — H93.293 OTHER ABNORMAL AUDITORY PERCEPTIONS, BILATERAL: ICD-10-CM

## 2025-01-24 DIAGNOSIS — H90.3 SENSORINEURAL HEARING LOSS, BILATERAL: ICD-10-CM

## 2025-01-24 DIAGNOSIS — H61.893 OTHER SPECIFIED DISORDERS OF EXTERNAL EAR, BILATERAL: ICD-10-CM

## 2025-01-24 PROCEDURE — 92567 TYMPANOMETRY: CPT

## 2025-01-24 PROCEDURE — 99203 OFFICE O/P NEW LOW 30 MIN: CPT

## 2025-01-24 PROCEDURE — 92557 COMPREHENSIVE HEARING TEST: CPT

## 2025-01-24 RX ORDER — AMLODIPINE BESYLATE 2.5 MG/1
2.5 TABLET ORAL
Refills: 0 | Status: ACTIVE | COMMUNITY

## 2025-01-24 RX ORDER — ATORVASTATIN CALCIUM 20 MG/1
20 TABLET, FILM COATED ORAL
Refills: 0 | Status: ACTIVE | COMMUNITY

## 2025-01-24 RX ORDER — METFORMIN HYDROCHLORIDE 750 MG/1
TABLET ORAL
Refills: 0 | Status: ACTIVE | COMMUNITY

## 2025-01-24 RX ORDER — FLUOCINOLONE ACETONIDE 0.11 MG/ML
0.01 OIL AURICULAR (OTIC)
Qty: 1 | Refills: 1 | Status: ACTIVE | COMMUNITY
Start: 2025-01-24 | End: 1900-01-01

## 2025-01-28 PROBLEM — H93.293 OTHER ABNORMAL AUDITORY PERCEPTIONS, BILATERAL: Status: ACTIVE | Noted: 2025-01-28

## 2025-01-28 PROBLEM — Z86.79 HISTORY OF HYPERTENSION: Status: RESOLVED | Noted: 2025-01-24 | Resolved: 2025-01-28

## 2025-01-28 PROBLEM — Z86.39 HISTORY OF DIABETES MELLITUS: Status: RESOLVED | Noted: 2025-01-24 | Resolved: 2025-01-28

## 2025-01-28 PROBLEM — Z86.39 HISTORY OF HIGH CHOLESTEROL: Status: RESOLVED | Noted: 2025-01-24 | Resolved: 2025-01-28
